# Patient Record
Sex: FEMALE | Race: OTHER | URBAN - METROPOLITAN AREA
[De-identification: names, ages, dates, MRNs, and addresses within clinical notes are randomized per-mention and may not be internally consistent; named-entity substitution may affect disease eponyms.]

---

## 2022-05-23 ENCOUNTER — HOSPITAL ENCOUNTER (OUTPATIENT)
Dept: LAB | Age: 37
Discharge: HOME | End: 2022-05-23

## 2022-05-23 ENCOUNTER — OFFICE VISIT (OUTPATIENT)
Dept: FAMILY MEDICINE CLINIC | Age: 37
End: 2022-05-23

## 2022-05-23 VITALS
OXYGEN SATURATION: 100 % | HEART RATE: 79 BPM | SYSTOLIC BLOOD PRESSURE: 159 MMHG | HEIGHT: 63 IN | TEMPERATURE: 97.8 F | WEIGHT: 175 LBS | BODY MASS INDEX: 31.01 KG/M2 | DIASTOLIC BLOOD PRESSURE: 90 MMHG

## 2022-05-23 DIAGNOSIS — G47.00 INSOMNIA, UNSPECIFIED TYPE: ICD-10-CM

## 2022-05-23 DIAGNOSIS — H11.433 CONJUNCTIVAL HYPEREMIA OF BOTH EYES: ICD-10-CM

## 2022-05-23 DIAGNOSIS — R03.0 ELEVATED BP WITHOUT DIAGNOSIS OF HYPERTENSION: ICD-10-CM

## 2022-05-23 DIAGNOSIS — G89.29 CHRONIC RUQ PAIN: ICD-10-CM

## 2022-05-23 DIAGNOSIS — R10.2 PELVIC PAIN: ICD-10-CM

## 2022-05-23 DIAGNOSIS — R10.11 CHRONIC RUQ PAIN: Primary | ICD-10-CM

## 2022-05-23 DIAGNOSIS — R10.11 CHRONIC RUQ PAIN: ICD-10-CM

## 2022-05-23 DIAGNOSIS — G89.29 CHRONIC RUQ PAIN: Primary | ICD-10-CM

## 2022-05-23 PROCEDURE — 81003 URINALYSIS AUTO W/O SCOPE: CPT

## 2022-05-23 PROCEDURE — 99203 OFFICE O/P NEW LOW 30 MIN: CPT | Performed by: FAMILY MEDICINE

## 2022-05-23 PROCEDURE — 85025 COMPLETE CBC W/AUTO DIFF WBC: CPT

## 2022-05-23 PROCEDURE — 83690 ASSAY OF LIPASE: CPT

## 2022-05-23 PROCEDURE — 83036 HEMOGLOBIN GLYCOSYLATED A1C: CPT

## 2022-05-23 PROCEDURE — 80053 COMPREHEN METABOLIC PANEL: CPT

## 2022-05-23 PROCEDURE — 80061 LIPID PANEL: CPT

## 2022-05-23 PROCEDURE — 84443 ASSAY THYROID STIM HORMONE: CPT

## 2022-05-23 PROCEDURE — 86038 ANTINUCLEAR ANTIBODIES: CPT

## 2022-05-23 RX ORDER — TRAZODONE HYDROCHLORIDE 50 MG/1
50 TABLET ORAL
Qty: 14 TABLET | Refills: 0 | Status: SHIPPED | OUTPATIENT
Start: 2022-05-23 | End: 2022-06-06

## 2022-05-23 RX ORDER — OLOPATADINE HYDROCHLORIDE 1 MG/ML
2 SOLUTION/ DROPS OPHTHALMIC 2 TIMES DAILY
Qty: 5 ML | Refills: 0 | Status: SHIPPED | OUTPATIENT
Start: 2022-05-23

## 2022-05-23 NOTE — PROGRESS NOTES
I have printed AVS and reviewed it with patient today. Patient verbalized understanding. I reviewed with patient medications sent to pharmacy and how the medication is taken. Patient verbalized understanding. The patient was texted [employer] coupons for prescriptions and I explained how the coupons are used. Patient verbalized understanding. I instructed patient to schedule a follow-up appointment prior to leaving today. Patient verbalized understanding. Patient correctly stated her full name and date of birth prior to the information shared.  Alyssa  with the [place] [id number] assisted with this discharge.  Susana Livingston RN

## 2022-05-23 NOTE — PROGRESS NOTES
Adam Mike is a 39 y.o. female   Chief Complaint   Patient presents with    Other     Please check derpession screening    Flank Pain     Pt c/o right flank pain radiationg to her right back x 2 weeks with pelvic pain x 1 years    Blood Pressure Check     Pt states she has BP problems x 1 year         ASSESSMENT AND PLAN:    1. Chronic RUQ pain  Exam unremarkable  Reports normal ultrasound and endoscopy in her home country. Will start with labs today and consider imaging pending results.    - HEMOGLOBIN A1C WITH EAG; Future  - LIPID PANEL; Future  - METABOLIC PANEL, COMPREHENSIVE; Future  - URINALYSIS W/ RFLX MICROSCOPIC; Future  - LIPASE; Future    2. Pelvic pain  No TTP. S/P oopherectomy and C/S. No herniations palpated. Will revisit at followup. 3. Insomnia, unspecified type  Start trazodone 50mg QHS for sleep. - CBC WITH AUTOMATED DIFF; Future  - TSH 3RD GENERATION; Future  - CHADD, DIRECT, W/REFLEX; Future  - traZODone (DESYREL) 50 mg tablet; Take 1 Tablet by mouth nightly for 14 days. Indications: insomnia associated with depression  Dispense: 14 Tablet; Refill: 0      4. Elevated BP without diagnosis of hypertension  Pt reports low Bps last week at home. Continue to monitor. If elevated on followup, start med. 5. Conjunctival hyperemia of both eyes  Allergic vs. Due to lack of sleep. Eye drops. Trazodone for sleep.  - olopatadine (PATANOL) 0.1 % ophthalmic solution; Administer 2 Drops to both eyes two (2) times a day. Indications: allergic conjunctivitis  Dispense: 5 mL; Refill: 0    SUBJECTIVE:    HPI:  Adam Mike is a 39 y.o. female who presents with multiple symptoms. She has been suffering from RUQ pain that radiates to her back. It is worse at night. She has to keep switching positions, but noting is comfortable. It is not associated with meals.  It is not typically associated with nausea, though a few weeks ago she did have 3 days of nausea and loss of appetite when the pain was very strong. She admits to occasional diarrhea. In her country she has had a RUQUS and endoscopy but everything was normal and she wasn't given any treatment. She also feels fleeting stabbing pains in her pelvis. She feels like there is a ball inside her right pelvis, though when she palpates she doesn't feel anything. Sometimes she feels it on the left too. People have told her it might be her ovaries, but she doesn't have a left ovary. 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 She states the baby was 10 lbs and the tumor was 6 lbs. They removed it at the same time as her C/S. Lately she just wants to stay in bed. But she can't sleep. She's getting about 3-4 hrs of sleep a night. Her mind starts racing in addition ot the pain. Her eyes have been red and swollen. She has had blurry vision for about 6 months -- she had an eye exam but was told everything was normal.  Her tongue has been swollen and tender. She has felt anxiety and depressive symptoms for a while but has never been treated. She has a BP machine at home. She reports this week her BP has been high but last week it was as low as 83/55. No h/o HTN    PMH: denies  PSH: C/S, left oopherectomy  MEDS: none  ALL: NKDA  SH: Denies   FH: M - DM2      Review of Systems   Constitutional: Positive for malaise/fatigue. Negative for fever. Eyes: Positive for blurred vision, discharge and redness. Respiratory: Negative for cough and shortness of breath. Cardiovascular: Negative for chest pain, palpitations and leg swelling. Gastrointestinal: Positive for abdominal pain and constipation. Negative for blood in stool, diarrhea, nausea and vomiting. Genitourinary: Positive for flank pain. Musculoskeletal: Positive for back pain and myalgias. Neurological: Negative for dizziness and headaches. Psychiatric/Behavioral: Positive for depression. Negative for suicidal ideas.  The patient is nervous/anxious and has insomnia. BP (!) 159/90 (BP 1 Location: Right arm, BP Patient Position: Sitting)   Pulse 79   Temp 97.8 °F (36.6 °C) (Temporal)   Ht 5' 2.6\" (1.59 m)   Wt 175 lb (79.4 kg)   SpO2 100%   BMI 31.40 kg/m²     Physical Exam  Constitutional:       Appearance: Normal appearance. Comments: Appears uncomfortable   HENT:      Mouth/Throat:      Mouth: Mucous membranes are moist.      Pharynx: Oropharynx is clear. No posterior oropharyngeal erythema. Eyes:      General:         Right eye: Discharge (watery) present. Left eye: Discharge present. Extraocular Movements: Extraocular movements intact. Conjunctiva/sclera:      Right eye: Right conjunctiva is injected. Left eye: Left conjunctiva is injected. Pupils: Pupils are equal, round, and reactive to light. Cardiovascular:      Rate and Rhythm: Normal rate and regular rhythm. Heart sounds: Normal heart sounds. Pulmonary:      Effort: Pulmonary effort is normal.      Breath sounds: Normal breath sounds. Abdominal:      General: Bowel sounds are normal. There is no distension. Palpations: Abdomen is soft. Tenderness: There is abdominal tenderness (\"a little\" tenderness in Left pelvis). There is no right CVA tenderness, left CVA tenderness, guarding or rebound. Hernia: No hernia is present. Musculoskeletal:      Right lower leg: No edema. Left lower leg: No edema. Lymphadenopathy:      Cervical: No cervical adenopathy. Neurological:      Mental Status: She is alert and oriented to person, place, and time.

## 2022-05-24 LAB
ALBUMIN SERPL-MCNC: 4.1 G/DL (ref 3.5–5)
ALBUMIN/GLOB SERPL: 1.2 {RATIO} (ref 1.1–2.2)
ALP SERPL-CCNC: 95 U/L (ref 45–117)
ALT SERPL-CCNC: 28 U/L (ref 12–78)
ANION GAP SERPL CALC-SCNC: 5 MMOL/L (ref 5–15)
APPEARANCE UR: CLEAR
AST SERPL-CCNC: 15 U/L (ref 15–37)
BASOPHILS # BLD: 0.1 K/UL (ref 0–0.1)
BASOPHILS NFR BLD: 1 % (ref 0–1)
BILIRUB SERPL-MCNC: 0.4 MG/DL (ref 0.2–1)
BILIRUB UR QL: NEGATIVE
BUN SERPL-MCNC: 8 MG/DL (ref 6–20)
BUN/CREAT SERPL: 13 (ref 12–20)
CALCIUM SERPL-MCNC: 9.6 MG/DL (ref 8.5–10.1)
CHLORIDE SERPL-SCNC: 108 MMOL/L (ref 97–108)
CHOLEST SERPL-MCNC: 201 MG/DL
CO2 SERPL-SCNC: 27 MMOL/L (ref 21–32)
COLOR UR: NORMAL
CREAT SERPL-MCNC: 0.6 MG/DL (ref 0.55–1.02)
DIFFERENTIAL METHOD BLD: ABNORMAL
EOSINOPHIL # BLD: 0.1 K/UL (ref 0–0.4)
EOSINOPHIL NFR BLD: 2 % (ref 0–7)
ERYTHROCYTE [DISTWIDTH] IN BLOOD BY AUTOMATED COUNT: 13.2 % (ref 11.5–14.5)
EST. AVERAGE GLUCOSE BLD GHB EST-MCNC: 108 MG/DL
GLOBULIN SER CALC-MCNC: 3.4 G/DL (ref 2–4)
GLUCOSE SERPL-MCNC: 91 MG/DL (ref 65–100)
GLUCOSE UR STRIP.AUTO-MCNC: NEGATIVE MG/DL
HBA1C MFR BLD: 5.4 % (ref 4–5.6)
HCT VFR BLD AUTO: 41.2 % (ref 35–47)
HDLC SERPL-MCNC: 56 MG/DL
HDLC SERPL: 3.6 {RATIO} (ref 0–5)
HGB BLD-MCNC: 13.6 G/DL (ref 11.5–16)
HGB UR QL STRIP: NEGATIVE
IMM GRANULOCYTES # BLD AUTO: 0 K/UL (ref 0–0.04)
IMM GRANULOCYTES NFR BLD AUTO: 1 % (ref 0–0.5)
KETONES UR QL STRIP.AUTO: NEGATIVE MG/DL
LDLC SERPL CALC-MCNC: 90.2 MG/DL (ref 0–100)
LEUKOCYTE ESTERASE UR QL STRIP.AUTO: NEGATIVE
LIPASE SERPL-CCNC: 133 U/L (ref 73–393)
LYMPHOCYTES # BLD: 1.4 K/UL (ref 0.8–3.5)
LYMPHOCYTES NFR BLD: 22 % (ref 12–49)
MCH RBC QN AUTO: 29.1 PG (ref 26–34)
MCHC RBC AUTO-ENTMCNC: 33 G/DL (ref 30–36.5)
MCV RBC AUTO: 88.2 FL (ref 80–99)
MONOCYTES # BLD: 0.4 K/UL (ref 0–1)
MONOCYTES NFR BLD: 6 % (ref 5–13)
NEUTS SEG # BLD: 4.3 K/UL (ref 1.8–8)
NEUTS SEG NFR BLD: 68 % (ref 32–75)
NITRITE UR QL STRIP.AUTO: NEGATIVE
NRBC # BLD: 0 K/UL (ref 0–0.01)
NRBC BLD-RTO: 0 PER 100 WBC
PH UR STRIP: 7 [PH] (ref 5–8)
PLATELET # BLD AUTO: 243 K/UL (ref 150–400)
PMV BLD AUTO: 12.6 FL (ref 8.9–12.9)
POTASSIUM SERPL-SCNC: 3.8 MMOL/L (ref 3.5–5.1)
PROT SERPL-MCNC: 7.5 G/DL (ref 6.4–8.2)
PROT UR STRIP-MCNC: NEGATIVE MG/DL
RBC # BLD AUTO: 4.67 M/UL (ref 3.8–5.2)
SODIUM SERPL-SCNC: 140 MMOL/L (ref 136–145)
SP GR UR REFRACTOMETRY: 1.01 (ref 1–1.03)
TRIGL SERPL-MCNC: 274 MG/DL (ref ?–150)
TSH SERPL DL<=0.05 MIU/L-ACNC: 3.79 UIU/ML (ref 0.36–3.74)
UR CULT HOLD, URHOLD: NORMAL
UROBILINOGEN UR QL STRIP.AUTO: 0.2 EU/DL (ref 0.2–1)
VLDLC SERPL CALC-MCNC: 54.8 MG/DL
WBC # BLD AUTO: 6.3 K/UL (ref 3.6–11)

## 2022-05-25 LAB — ANA SER QL: NEGATIVE

## 2022-05-29 NOTE — PROGRESS NOTES
CHADD   UA WNL  CMP, CBC, Lipase, A1C WNL  Tgs elevated, otherwise normal lipid panel  Slight TSH elevation. Will discuss with the patient at followup.

## 2022-06-06 ENCOUNTER — HOSPITAL ENCOUNTER (OUTPATIENT)
Dept: LAB | Age: 37
Discharge: HOME | End: 2022-06-06

## 2022-06-06 ENCOUNTER — OFFICE VISIT (OUTPATIENT)
Dept: FAMILY MEDICINE CLINIC | Age: 37
End: 2022-06-06

## 2022-06-06 VITALS
SYSTOLIC BLOOD PRESSURE: 138 MMHG | DIASTOLIC BLOOD PRESSURE: 70 MMHG | HEIGHT: 63 IN | TEMPERATURE: 97.7 F | HEART RATE: 73 BPM | WEIGHT: 178 LBS | OXYGEN SATURATION: 100 % | BODY MASS INDEX: 31.54 KG/M2

## 2022-06-06 DIAGNOSIS — R30.0 DYSURIA: Primary | ICD-10-CM

## 2022-06-06 DIAGNOSIS — R14.2 BURPING: ICD-10-CM

## 2022-06-06 DIAGNOSIS — K59.00 CONSTIPATION, UNSPECIFIED CONSTIPATION TYPE: ICD-10-CM

## 2022-06-06 DIAGNOSIS — R30.0 DYSURIA: ICD-10-CM

## 2022-06-06 DIAGNOSIS — R35.0 URINARY FREQUENCY: ICD-10-CM

## 2022-06-06 LAB
BILIRUB UR QL STRIP: NEGATIVE
GLUCOSE UR-MCNC: NEGATIVE MG/DL
KETONES P FAST UR STRIP-MCNC: NEGATIVE MG/DL
PH UR STRIP: 7 [PH] (ref 4.6–8)
PROT UR QL STRIP: NEGATIVE
SP GR UR STRIP: 1.02 (ref 1–1.03)
UA UROBILINOGEN AMB POC: NORMAL (ref 0.2–1)
URINALYSIS CLARITY POC: CLEAR
URINALYSIS COLOR POC: YELLOW
URINE BLOOD POC: NORMAL
URINE LEUKOCYTES POC: NORMAL
URINE NITRITES POC: NEGATIVE

## 2022-06-06 PROCEDURE — 87086 URINE CULTURE/COLONY COUNT: CPT

## 2022-06-06 PROCEDURE — 81002 URINALYSIS NONAUTO W/O SCOPE: CPT | Performed by: FAMILY MEDICINE

## 2022-06-06 PROCEDURE — 99213 OFFICE O/P EST LOW 20 MIN: CPT | Performed by: FAMILY MEDICINE

## 2022-06-06 RX ORDER — SIMETHICONE 125 MG
125 TABLET,CHEWABLE ORAL
Qty: 60 TABLET | Refills: 1 | Status: SHIPPED | OUTPATIENT
Start: 2022-06-06 | End: 2022-09-07

## 2022-06-06 RX ORDER — DOCUSATE SODIUM 100 MG/1
100 CAPSULE, LIQUID FILLED ORAL 2 TIMES DAILY
Qty: 60 CAPSULE | Refills: 2 | Status: SHIPPED | OUTPATIENT
Start: 2022-06-06 | End: 2022-09-04

## 2022-06-06 RX ORDER — NITROFURANTOIN 25; 75 MG/1; MG/1
100 CAPSULE ORAL 2 TIMES DAILY
Qty: 14 CAPSULE | Refills: 0 | Status: SHIPPED | OUTPATIENT
Start: 2022-06-06 | End: 2022-06-13

## 2022-06-06 RX ORDER — PHENAZOPYRIDINE HYDROCHLORIDE 200 MG/1
200 TABLET, FILM COATED ORAL
Qty: 9 TABLET | Refills: 0 | Status: SHIPPED | OUTPATIENT
Start: 2022-06-06 | End: 2022-06-09

## 2022-06-06 NOTE — PROGRESS NOTES
An After Visit Summary was printed and reviewed with the patient. Informed patient to give name and date of birth when picking up medication. Patient verbalized understanding.   Keith So

## 2022-06-06 NOTE — PROGRESS NOTES
Dia Dodge is a 39 y.o. female   Chief Complaint   Patient presents with    Follow-up     multiple symptoms         ASSESSMENT AND PLAN:    1. Dysuria  2. Urinary frequency  - AMB POC URINALYSIS DIP STICK MANUAL W/O MICRO  - CULTURE, URINE; Future  - nitrofurantoin, macrocrystal-monohydrate, (Macrobid) 100 mg capsule; Take 1 Capsule by mouth two (2) times a day for 7 days. Indications: bacterial urinary tract infection  Dispense: 14 Capsule; Refill: 0  - phenazopyridine (PYRIDIUM) 200 mg tablet; Take 1 Tablet by mouth three (3) times daily as needed for Pain for up to 3 days. Dispense: 9 Tablet; Refill: 0  - CULTURE, URINE; Future    3. Burping  - simethicone (GAS-X) 125 mg chewable tablet; Take 1 Tablet by mouth every six (6) hours as needed for Flatulence. Dispense: 60 Tablet; Refill: 1    4. Constipation, unspecified constipation type  - docusate sodium (COLACE) 100 mg capsule; Take 1 Capsule by mouth two (2) times a day for 90 days. Dispense: 60 Capsule; Refill: 2      SUBJECTIVE:    HPI:  Dia Dodge is a 39 y.o. female who presents for followup. She was last seen 5/23 with RUQ pain, pelvic pain, insomnia, eye irritation and variable blood pressures. She reports she only had RUQ once since the last visit and she has been sleeping better. We reviewed her overall normal labwork. She is still having variable blood pressures. She states she has had urinary issues on and off for 27 years. She currently has urinary frequency and burning with urination. No urgency, no hematuria, no fevers. She has flank pain at night which is better when she lies down. Her ankles start to swell after 2 pm.  No vaginal discharge,    She feels like she is burping a lot. She is also flatulent all the time. It is not related to food. No N/V. No abdominal pain. She admits to constipation. Review of Systems   Constitutional: Negative for fever and malaise/fatigue. Eyes: Negative for blurred vision.    Respiratory: Negative for cough and shortness of breath. Cardiovascular: Positive for leg swelling. Negative for chest pain and palpitations. Gastrointestinal: Positive for constipation. Negative for abdominal pain, diarrhea, nausea and vomiting. Genitourinary: Positive for dysuria, flank pain and frequency. Negative for hematuria and urgency. Neurological: Negative for dizziness and headaches. /70 (BP 1 Location: Right arm, BP Patient Position: Sitting)   Pulse 73   Temp 97.7 °F (36.5 °C) (Temporal)   Ht 5' 2.6\" (1.59 m)   Wt 178 lb (80.7 kg)   LMP  (LMP Unknown)   SpO2 100%   BMI 31.94 kg/m²     Physical Exam  Constitutional:       General: She is not in acute distress. Appearance: Normal appearance. Eyes:      Extraocular Movements: Extraocular movements intact. Conjunctiva/sclera: Conjunctivae normal.      Pupils: Pupils are equal, round, and reactive to light. Cardiovascular:      Rate and Rhythm: Normal rate and regular rhythm. Heart sounds: Normal heart sounds. Pulmonary:      Effort: Pulmonary effort is normal.      Breath sounds: Normal breath sounds. Abdominal:      General: Bowel sounds are normal. There is no distension. Tenderness: There is no abdominal tenderness. There is no right CVA tenderness, left CVA tenderness or guarding. Neurological:      Mental Status: She is alert.

## 2022-06-06 NOTE — PROGRESS NOTES
Results for orders placed or performed in visit on 06/06/22   AMB POC URINALYSIS DIP STICK MANUAL W/O MICRO   Result Value Ref Range    Color (UA POC) Yellow     Clarity (UA POC) Clear     Glucose (UA POC) Negative Negative    Bilirubin (UA POC) Negative Negative    Ketones (UA POC) Negative Negative    Specific gravity (UA POC) 1.020 1.001 - 1.035    Blood (UA POC) Trace Negative    pH (UA POC) 7.0 4.6 - 8.0    Protein (UA POC) Negative Negative    Urobilinogen (UA POC) 0.2 mg/dL 0.2 - 1    Nitrites (UA POC) Negative Negative    Leukocyte esterase (UA POC) Trace Negative

## 2022-06-06 NOTE — PROGRESS NOTES
Coordination of Care  1. Have you been to the ER, urgent care clinic since your last visit? Hospitalized since your last visit? No    2. Have you seen or consulted any other health care providers outside of the [address] [place] [address] since your last visit? Include any pap smears or colon screening. No    Does the patient need refills? NO    Learning Assessment Complete?  yes  Depression Screening complete in the past 12 months? yes

## 2022-06-09 LAB
BACTERIA SPEC CULT: NORMAL
SERVICE CMNT-IMP: NORMAL

## 2022-07-05 ENCOUNTER — OFFICE VISIT (OUTPATIENT)
Dept: FAMILY MEDICINE CLINIC | Age: 37
End: 2022-07-05

## 2022-07-05 VITALS
OXYGEN SATURATION: 99 % | HEIGHT: 63 IN | WEIGHT: 176 LBS | TEMPERATURE: 97.7 F | BODY MASS INDEX: 31.18 KG/M2 | HEART RATE: 80 BPM | DIASTOLIC BLOOD PRESSURE: 79 MMHG | SYSTOLIC BLOOD PRESSURE: 141 MMHG

## 2022-07-05 DIAGNOSIS — F33.1 MODERATE EPISODE OF RECURRENT MAJOR DEPRESSIVE DISORDER (HCC): Primary | ICD-10-CM

## 2022-07-05 PROCEDURE — 99214 OFFICE O/P EST MOD 30 MIN: CPT | Performed by: FAMILY MEDICINE

## 2022-07-05 RX ORDER — SERTRALINE HYDROCHLORIDE 25 MG/1
25 TABLET, FILM COATED ORAL DAILY
Qty: 30 TABLET | Refills: 2 | Status: SHIPPED | OUTPATIENT
Start: 2022-07-05 | End: 2022-08-08 | Stop reason: SDUPTHER

## 2022-07-05 NOTE — PROGRESS NOTES
Coordination of Care  1. Have you been to the ER, urgent care clinic since your last visit? Hospitalized since your last visit? No    2. Have you seen or consulted any other health care providers outside of the [address] [place] [address] since your last visit? Include any pap smears or colon screening. No    Does the patient need refills? YES    Learning Assessment Complete?  yes  Depression Screening complete in the past 12 months? yes      Visual Acuity Screening    Right eye Left eye Both eyes   Without correction: 20/80 20/63 20/63   With correction:

## 2022-07-05 NOTE — PROGRESS NOTES
An After Visit Summary was printed and given to the patient. Discharge medications reviewed with patient and appropriate educational materials and side effects teaching were provided. Goodrx coupon provided and RN explained use. RN provided the national lifeline phone number in case of crisis and explained that our counselor's team will reach out for appointment scheduling. Time for questions and answers provided, patient verbalized understanding. Patient discharged from clinic in stable condition. REN  Jaylin Sagastume assisted with this d/c. 0.25

## 2022-07-05 NOTE — PROGRESS NOTES
Kwan Santiago is a 40 y.o. female   Chief Complaint   Patient presents with    Other     Depression f/up    Abdominal Pain     dysuria, f/up    Eye Problem     bilateral blurred vision    Medication Refill     Pt is not sure which meds she needs         ASSESSMENT AND PLAN:    1. Moderate episode of recurrent major depressive disorder (HCC)  PHQ9 23 today. Start zoloft - r/b/i reviewed. Refer to [address] [place] [address]  Follow up in one month, sooner if needed. ED for worsening SI.  - REFERRAL TO BEHAVIORAL HEALTH  - sertraline (ZOLOFT) 25 mg tablet; Take 1 Tablet by mouth daily. Indications: major depressive disorder  Dispense: 30 Tablet; Refill: 2    SUBJECTIVE:    HPI:  Kwan Santiago is a 40 y.o. female who presents for followup. She reports that her abdominal pain has improved, she had 2 mild episodes in the past month. Today her main concern is that she is feeling very down. She has no interest in doing anything or leaving her house. She doesn't even want to cook. She is sleeping poorly and has no energy. Her body aches. She has lost her appetite. She feels bad about herself. She cannot concentrate and is forgetting things easily. She gets upset/emotional easily. She does not want to kill herself but has thought she'd be better off dead. No planning. She has 2 kids in [place] who are also struggling and she feels terrible that she is not there to help them. She doesn't have a support system here. She never goes out. She has felt depressed in the past but hasn't received treatment. She is interested in both therapy and medication management. Review of Systems   Constitutional: Positive for malaise/fatigue. Negative for fever. Eyes: Negative for blurred vision. Respiratory: Negative for cough and shortness of breath. Cardiovascular: Negative for chest pain, palpitations and leg swelling. Gastrointestinal: Negative for abdominal pain, constipation, diarrhea, nausea and vomiting. Musculoskeletal: Positive for myalgias. Neurological: Negative for dizziness and headaches. Psychiatric/Behavioral: Positive for depression and suicidal ideas. Negative for substance abuse. The patient has insomnia. The patient is not nervous/anxious. BP (!) 141/79 (BP 1 Location: Right arm, BP Patient Position: Sitting)   Pulse 80   Temp 97.7 °F (36.5 °C) (Temporal)   Ht 5' 2.6\" (1.59 m)   Wt 176 lb (79.8 kg)   LMP  (LMP Unknown)   SpO2 99%   BMI 31.58 kg/m²     Physical Exam  Constitutional:       General: She is not in acute distress. Appearance: Normal appearance. Eyes:      Extraocular Movements: Extraocular movements intact. Conjunctiva/sclera: Conjunctivae normal.      Pupils: Pupils are equal, round, and reactive to light. Cardiovascular:      Rate and Rhythm: Normal rate and regular rhythm. Heart sounds: Normal heart sounds. Pulmonary:      Effort: Pulmonary effort is normal.      Breath sounds: Normal breath sounds. Neurological:      Mental Status: She is alert. Psychiatric:         Attention and Perception: Attention normal.         Mood and Affect: Mood is depressed. Affect is flat and tearful.          Speech: Speech normal.         Behavior: Behavior normal.

## 2022-08-08 ENCOUNTER — OFFICE VISIT (OUTPATIENT)
Dept: FAMILY MEDICINE CLINIC | Age: 37
End: 2022-08-08

## 2022-08-08 VITALS
SYSTOLIC BLOOD PRESSURE: 119 MMHG | TEMPERATURE: 96.9 F | OXYGEN SATURATION: 100 % | DIASTOLIC BLOOD PRESSURE: 70 MMHG | WEIGHT: 174.8 LBS | HEART RATE: 67 BPM | BODY MASS INDEX: 30.97 KG/M2 | HEIGHT: 63 IN | RESPIRATION RATE: 18 BRPM

## 2022-08-08 DIAGNOSIS — R30.0 DYSURIA: ICD-10-CM

## 2022-08-08 DIAGNOSIS — F33.1 MODERATE EPISODE OF RECURRENT MAJOR DEPRESSIVE DISORDER (HCC): Primary | ICD-10-CM

## 2022-08-08 LAB
BILIRUB UR QL STRIP: NEGATIVE
GLUCOSE UR-MCNC: NEGATIVE MG/DL
KETONES P FAST UR STRIP-MCNC: NEGATIVE MG/DL
PH UR STRIP: 7 [PH] (ref 4.6–8)
PROT UR QL STRIP: NEGATIVE
SP GR UR STRIP: 1.02 (ref 1–1.03)
UA UROBILINOGEN AMB POC: NORMAL (ref 0.2–1)
URINALYSIS CLARITY POC: CLEAR
URINALYSIS COLOR POC: YELLOW
URINE BLOOD POC: NEGATIVE
URINE LEUKOCYTES POC: NEGATIVE
URINE NITRITES POC: NEGATIVE

## 2022-08-08 PROCEDURE — 81002 URINALYSIS NONAUTO W/O SCOPE: CPT | Performed by: FAMILY MEDICINE

## 2022-08-08 PROCEDURE — 99214 OFFICE O/P EST MOD 30 MIN: CPT | Performed by: FAMILY MEDICINE

## 2022-08-08 RX ORDER — SERTRALINE HYDROCHLORIDE 25 MG/1
50 TABLET, FILM COATED ORAL DAILY
Qty: 60 TABLET | Refills: 1 | Status: SHIPPED | OUTPATIENT
Start: 2022-08-08 | End: 2022-09-07 | Stop reason: ALTCHOICE

## 2022-08-08 NOTE — PROGRESS NOTES
Chief Complaint   Patient presents with    Depression     F/up - pt states feels much better than last time (see PHQ-9); note pt has been doubling up on sertraline on \"days when I fee like I need more\"    Dysuria     Burning on urination for past several days; denies dysuria, urinary frequency   Visit Vitals  /70 (BP 1 Location: Left upper arm, BP Patient Position: Sitting)   Pulse 67   Temp 96.9 °F (36.1 °C) (Temporal)   Resp 18   Ht 5' 2.6\" (1.59 m)   Wt 174 lb 12.8 oz (79.3 kg)   SpO2 100%   BMI 31.36 kg/m²     Results for orders placed or performed in visit on 08/08/22   AMB POC URINALYSIS DIP STICK MANUAL W/O MICRO   Result Value Ref Range    Color (UA POC) Yellow     Clarity (UA POC) Clear     Glucose (UA POC) Negative Negative    Bilirubin (UA POC) Negative Negative    Ketones (UA POC) Negative Negative    Specific gravity (UA POC) 1.020 1.001 - 1.035    Blood (UA POC) Negative Negative    pH (UA POC) 7.0 4.6 - 8.0    Protein (UA POC) Negative Negative    Urobilinogen (UA POC) 0.2 mg/dL 0.2 - 1    Nitrites (UA POC) Negative Negative    Leukocyte esterase (UA POC) Negative Negative       Coordination of Care  1. Have you been to the ER, urgent care clinic since your last visit? Hospitalized since your last visit? No    2. Have you seen or consulted any other health care providers outside of the [address] [place] [address] since your last visit? Include any pap smears or colon screening. No    Does the patient need refills? NO    Learning Assessment Complete?  yes  Depression Screening complete in the past 12 months? yes

## 2022-08-08 NOTE — PROGRESS NOTES
Name and  confirmed w/ patient. An After Visit Summary was provided and all discharge instructions were reviewed with the patient including: f/up appt and refills. RN emphasized importance of only taking medications as prescribed. Time for questions and answers provided, patient verbalized understanding. Patient discharged from clinic in stable condition. CVAN  Kerry De La Vega assisted with this d/c.

## 2022-08-08 NOTE — PROGRESS NOTES
Arron Woods is a 40 y.o. female   Chief Complaint   Patient presents with    Depression     F/up - pt states feels much better than last time (see PHQ-9); note pt has been doubling up on sertraline on \"days when I fee like I need more\"    Dysuria     Burning on urination for past several days; denies dysuria, urinary frequency         ASSESSMENT AND PLAN:    1. Moderate episode of recurrent major depressive disorder (HCC)  PHQ-9 down to 9 from 23. Increase zoloft to 50mg daily. Follow up with Miki. Follow up with me VV in 1 month. - sertraline (ZOLOFT) 25 mg tablet; Take 2 Tablets by mouth in the morning. Indications: major depressive disorder  Dispense: 60 Tablet; Refill: 1    2. Dysuria  Urine dip negative for infection and blood. - AMB POC URINALYSIS DIP STICK MANUAL W/O MICRO      SUBJECTIVE:    HPI:  Arron Woods is a 40 y.o. female who presents for follow-up on depression. At her last visit 7/5 we started Zoloft 25mg. She reports she has been feeling much better with her mood, but also her somatic symptoms have improved. She notes that at first she didn't see much of a difference and that some days when she's feeling down she'll take 1.5 or 2 tabs of the zoloft and feels it helps. She denies medication side effects. She sometimes gets sad when she hears bad news but rebounds more quickly. She is sleeping well. Her appetite is decreased but she suspects it's from the heat. Her energy is better. She still has occasional thoughts she'd be better off dead but not as frequently or as severe. She has had some intermittent burning with urination. No frequency, urgency, hematuria, flank pain, fevers, vaginal discharge, vulvar irritation or itching. Review of Systems   Constitutional:  Negative for fever and malaise/fatigue. Eyes:  Negative for blurred vision. Respiratory:  Negative for cough and shortness of breath.     Cardiovascular:  Negative for chest pain, palpitations and leg swelling. Gastrointestinal:  Negative for abdominal pain, constipation, diarrhea, nausea and vomiting. Genitourinary:  Positive for dysuria. Negative for flank pain, frequency, hematuria and urgency. Neurological:  Negative for dizziness and headaches. Psychiatric/Behavioral:  Positive for depression and suicidal ideas (passive, improving). The patient does not have insomnia. /70 (BP 1 Location: Left upper arm, BP Patient Position: Sitting)   Pulse 67   Temp 96.9 °F (36.1 °C) (Temporal)   Resp 18   Ht 5' 2.6\" (1.59 m)   Wt 174 lb 12.8 oz (79.3 kg)   SpO2 100%   BMI 31.36 kg/m²     Physical Exam  Constitutional:       General: She is not in acute distress. Appearance: Normal appearance. Eyes:      Extraocular Movements: Extraocular movements intact. Conjunctiva/sclera: Conjunctivae normal.      Pupils: Pupils are equal, round, and reactive to light. Cardiovascular:      Rate and Rhythm: Normal rate and regular rhythm. Heart sounds: Normal heart sounds. Pulmonary:      Effort: Pulmonary effort is normal.      Breath sounds: Normal breath sounds. Neurological:      Mental Status: She is alert.

## 2022-09-06 ENCOUNTER — OFFICE VISIT (OUTPATIENT)
Dept: FAMILY MEDICINE CLINIC | Age: 37
End: 2022-09-06

## 2022-09-06 DIAGNOSIS — F33.0 MDD (MAJOR DEPRESSIVE DISORDER), RECURRENT EPISODE, MILD (HCC): Primary | ICD-10-CM

## 2022-09-06 PROCEDURE — 90791 PSYCH DIAGNOSTIC EVALUATION: CPT | Performed by: SOCIAL WORKER

## 2022-09-07 ENCOUNTER — VIRTUAL VISIT (OUTPATIENT)
Dept: FAMILY MEDICINE CLINIC | Age: 37
End: 2022-09-07

## 2022-09-07 DIAGNOSIS — F33.1 MODERATE EPISODE OF RECURRENT MAJOR DEPRESSIVE DISORDER (HCC): Primary | ICD-10-CM

## 2022-09-07 PROCEDURE — 99213 OFFICE O/P EST LOW 20 MIN: CPT | Performed by: FAMILY MEDICINE

## 2022-09-07 RX ORDER — BUPROPION HYDROCHLORIDE 150 MG/1
150 TABLET ORAL DAILY
Qty: 30 TABLET | Refills: 2 | Status: SHIPPED | OUTPATIENT
Start: 2022-09-07 | End: 2022-10-12

## 2022-09-07 NOTE — PROGRESS NOTES
Tc to the pt for intake. AMN Int #  [id number]. the pt verified her name and . The pt stated she is taking Neurobion for the brain, from the [address] she is taking a Be Active Calcium supplement, she bought from the [facility] [employer]. Coordination of Care  1. Have you been to the ER, urgent care clinic since your last visit? Hospitalized since your last visit? No    2. Have you seen or consulted any other health care providers outside of the [address] [place] [address] since your last visit? Include any pap smears or colon screening. No    Does the patient need refills?  NO    Learning Assessment Complete? no  Depression Screening complete in the past 12 months? yes

## 2022-09-07 NOTE — PROGRESS NOTES
Katrina Callahan is a 40 y.o. female   Chief Complaint   Patient presents with    Depression     Follow up.      >>>>>>>>>>>>>TELEPHONE ENCOUNTER<<<<<<<<<<<<<<<<<<<<      ASSESSMENT AND PLAN:    1. Moderate episode of recurrent major depressive disorder ([facility] [address]  PHQ9: 9 today, stable from last visit. Pt interested in medication adjustment due to persisting lack of motivation. Discussed increasing dose of sertraline, adding another medication to sertraline, or switching medication. Pt preferred to switch medication. Start bupropion XL 150mg daily. Follow up in one month. Continue appointments with Shannen Denise as previously planned. - buPROPion XL (WELLBUTRIN XL) 150 mg tablet; Take 1 Tablet by mouth daily. Indications: major depressive disorder  Dispense: 30 Tablet; Refill: 2    SUBJECTIVE:    HPI:  Katrina Callahan is a 40 y.o. female who presents for follow up on MDD. She has been taking 50mg sertraline daily. She had an initial impressive improvement from a PHQ9 of 23 to 9. She has not seen additional progress since then despite the increase and reports she has good days and bad days. The bothersome symptom to her is her lack of motivation. She doesn't want to leave her house and has difficulty getting things done. She had an appointment with [address] [place] [address] yesterday. She states it's going well and she feels like it is helping. They have scheduled followup. She admits that she tried increasing her sertraline dose to 2.5 tabs (62.5 mg) but she started having tachycardia so she went back to 2 tabs. Review of Systems   Constitutional:  Positive for malaise/fatigue. Negative for fever. Eyes:  Negative for blurred vision. Respiratory:  Negative for cough and shortness of breath. Cardiovascular:  Negative for chest pain, palpitations and leg swelling. Gastrointestinal:  Negative for abdominal pain, constipation, diarrhea, nausea and vomiting. Neurological:  Negative for dizziness and headaches. Psychiatric/Behavioral:  Positive for depression. Negative for suicidal ideas. The patient does not have insomnia. There were no vitals taken for this visit.     Physical Exam - telephone encounter

## 2022-09-07 NOTE — PROGRESS NOTES
Check-out Note: Stop sertraline   Start bupropion XL one tab once daily; sent to [facility] to the pt. AMN # Int [id number]. The pt verified her name and . The pt was given the entire checkout note above and told the rx will cost $15 at the [facility] no coupon is needed. There are 2 refills on the rx. The rx was fully explained to the pt an she was told to stop the medicine Zoloft she had been taking, and start the new one Bupropion. She was told he could go  the medication in about an hour.  David Cunningham, RN
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
## 2022-10-12 ENCOUNTER — VIRTUAL VISIT (OUTPATIENT)
Dept: FAMILY MEDICINE CLINIC | Age: 37
End: 2022-10-12

## 2022-10-12 DIAGNOSIS — F33.0 MDD (MAJOR DEPRESSIVE DISORDER), RECURRENT EPISODE, MILD (HCC): Primary | ICD-10-CM

## 2022-10-12 PROCEDURE — 99213 OFFICE O/P EST LOW 20 MIN: CPT | Performed by: FAMILY MEDICINE

## 2022-10-12 RX ORDER — VENLAFAXINE HYDROCHLORIDE 75 MG/1
75 CAPSULE, EXTENDED RELEASE ORAL DAILY
Qty: 30 CAPSULE | Refills: 1 | Status: SHIPPED | OUTPATIENT
Start: 2022-10-12 | End: 2022-10-28 | Stop reason: SDUPTHER

## 2022-10-12 NOTE — PROGRESS NOTES
Tc to the pt for intake. Sonia Conti . The pt verified her name and .     160/95 and pulse was 78. A couple of days ago. Coordination of Care  1. Have you been to the ER, urgent care clinic since your last visit? Hospitalized since your last visit? No    2. Have you seen or consulted any other health care providers outside of the [address] [place] [address] since your last visit? Include any pap smears or colon screening. No    Does the patient need refills?  YES    Learning Assessment Complete? no  Depression Screening complete in the past 12 months? yes

## 2022-10-12 NOTE — PROGRESS NOTES
Discharge with Nathanael Goyal . Told pt that rx's have been sent to pharmacy and they should be ready for  in approximately 2 hrs. The medication was reviewed with the pt. Brandon Ram RN

## 2022-10-12 NOTE — PROGRESS NOTES
David Cortez is a 40 y.o. female   Chief Complaint   Patient presents with    Depression     Follow up.     >>>>>>>>>>>>>TELEPHONE ENCOUNTER<<<<<<<<<<<<<<<<<<<<      ASSESSMENT AND PLAN:    1. MDD (major depressive disorder), recurrent episode, mild (HCC)  PHQ9 up to 14 (was 9 last month, 23 initially)  Worsening after switching from zoloft to wellbutrin and bad news. Pt wishes to switch medications. Energy and motivation are the most prominent symptoms. Will start venlafaxine as it is one of the more activating anti-depressants. Follow up with me in one month, sooner if needed (worsening symptoms, medication side effects)  Has appointment with Jassi Wang in November. - venlafaxine-SR (EFFEXOR-XR) 75 mg capsule; Take 1 Capsule by mouth daily. Indications: major depressive disorder  Dispense: 30 Capsule; Refill: 1    Noted elevated BP after visit, patient has had elevations in the past, though her most recent office BP was WNL. May need medication. Will schedule face to face apointment for BP check. SUBJECTIVE:    HPI:  David Crotez is a 40 y.o. female who presents via telephone for depression. After her last visit 9/7 we switched from zoloft to wellbutrin because her progress had stalled, but an increased dose caused side effects. Patient reports that in the past month she has felt worse and then in the past 3 days she has felt very very bad. She heard that her grandmother is very sick. She is unable to travel to see her, but has been speaking with her on the phone. She has been feeling bad about herself. She has not had the energy to bathe herself and does not want to leave the house. She notes that she is eating well and sleeping normally. Review of Systems   Constitutional:  Positive for malaise/fatigue. Negative for fever. Eyes:  Negative for blurred vision. Respiratory:  Negative for cough and shortness of breath.     Cardiovascular:  Negative for chest pain, palpitations and leg swelling. Gastrointestinal:  Negative for abdominal pain, constipation, diarrhea, nausea and vomiting. Musculoskeletal:  Positive for myalgias. Neurological:  Negative for dizziness and headaches. Psychiatric/Behavioral:  Positive for depression. Negative for suicidal ideas. The patient does not have insomnia. There were no vitals taken for this visit. Physical Exam -- telephone encounter.

## 2022-10-28 ENCOUNTER — OFFICE VISIT (OUTPATIENT)
Dept: FAMILY MEDICINE CLINIC | Age: 37
End: 2022-10-28

## 2022-10-28 VITALS
SYSTOLIC BLOOD PRESSURE: 117 MMHG | HEIGHT: 63 IN | HEART RATE: 72 BPM | OXYGEN SATURATION: 98 % | TEMPERATURE: 98.1 F | BODY MASS INDEX: 30.83 KG/M2 | WEIGHT: 174 LBS | RESPIRATION RATE: 20 BRPM | DIASTOLIC BLOOD PRESSURE: 58 MMHG

## 2022-10-28 DIAGNOSIS — F33.0 MDD (MAJOR DEPRESSIVE DISORDER), RECURRENT EPISODE, MILD (HCC): Primary | ICD-10-CM

## 2022-10-28 DIAGNOSIS — M54.2 NECK PAIN: ICD-10-CM

## 2022-10-28 PROCEDURE — 99214 OFFICE O/P EST MOD 30 MIN: CPT | Performed by: FAMILY MEDICINE

## 2022-10-28 RX ORDER — CYCLOBENZAPRINE HCL 10 MG
10 TABLET ORAL
Qty: 20 TABLET | Refills: 0 | Status: SHIPPED | OUTPATIENT
Start: 2022-10-28

## 2022-10-28 RX ORDER — VENLAFAXINE HYDROCHLORIDE 75 MG/1
75 CAPSULE, EXTENDED RELEASE ORAL DAILY
Qty: 90 CAPSULE | Refills: 1 | Status: SHIPPED | OUTPATIENT
Start: 2022-10-28

## 2022-10-28 NOTE — PROGRESS NOTES
Chief Complaint   Patient presents with    Depression     F/up; pt states she feels better since switching to Effexor    Blood Pressure Check     Per last note, f/up for BP check     Visit Vitals  BP (!) 117/58 (BP 1 Location: Left upper arm, BP Patient Position: Sitting)   Pulse 72   Temp 98.1 °F (36.7 °C) (Oral)   Resp 20   Ht 5' 2.6\" (1.59 m)   Wt 174 lb (78.9 kg)   SpO2 98%   BMI 31.22 kg/m²       Coordination of Care  1. Have you been to the ER, urgent care clinic since your last visit? Hospitalized since your last visit? No    2. Have you seen or consulted any other health care providers outside of the [address] [place] [address] since your last visit? Include any pap smears or colon screening. No    Does the patient need refills? YES    Learning Assessment Complete?  yes  Depression Screening complete in the past 12 months? yes

## 2022-10-28 NOTE — PROGRESS NOTES
Ashok Pollack seen at d/c, full name and  verified. After Visit Summary provided and reviewed along with discharge instructions and when it is recommended to come back. Advised patient to call [facility] phone line to schedule follow up appointment in 3 months or sooner with acute concerns. Reviewed medication list with the patient to ensure she knows how to and when to take her medications. Side effects, mechanisms of action and medication compliance were reiterated to ensure proper understanding. Time for questions and answers provided, patient verbalizes understanding.

## 2022-10-28 NOTE — PROGRESS NOTES
Mj Schuler is a 40 y.o. female   Chief Complaint   Patient presents with    Depression     F/up; pt states she feels better since switching to Effexor    Blood Pressure Check     Per last note, f/up for BP check         ASSESSMENT AND PLAN:    1. MDD (major depressive disorder), recurrent episode, mild ([address]  PHQ9 is 3 today, down from 14 two weeks ago. No medications halina effects. Continue Effexoer. [facility] appt with Pao Ana Lilia 11/15  Follow up with me in 3 months, sooner with acute concerns or worsening of symptoms. - venlafaxine-SR (EFFEXOR-XR) 75 mg capsule; Take 1 Capsule by mouth daily. Indications: major depressive disorder  Dispense: 90 Capsule; Refill: 1    2. Neck pain  - cyclobenzaprine (FLEXERIL) 10 mg tablet; Take 1 Tablet by mouth nightly as needed for Muscle Spasm(s) (dolor de la nucha/ tension muscular). Dispense: 20 Tablet; Refill: 0      SUBJECTIVE:    HPI:  Mj Schuler is a 40 y.o. female who presents  for follow up. At her virtual visit 10/12 we switched to Effexor after her symptoms worsened after switching from zoloft to wellbutrin for a plateau-ing of improvement on zoloft. She had also mentioned an elevated BP to the 185B systolic. Today her BP is normal.  She reports the effexor has seemed to help. She is sleeping well. Her energy and motivation have returned. Her appetite has returned. Her self esteem has improved. She is able to concentrate at work. No thoughts of hurting herself. She does note some mild anxiety and feels like she is a little forgetful. She also notes upper neck/occipital pain that is worse when she is feeling stressed out. Her neck feels tense. Review of Systems   Constitutional:  Negative for fever and malaise/fatigue. Eyes:  Negative for blurred vision. Respiratory:  Negative for cough and shortness of breath. Cardiovascular:  Negative for chest pain, palpitations and leg swelling.    Gastrointestinal:  Negative for abdominal pain, constipation, diarrhea, nausea and vomiting. Musculoskeletal:  Positive for neck pain. Neurological:  Negative for dizziness, sensory change, focal weakness and headaches. Psychiatric/Behavioral:  Positive for depression and memory loss. Negative for substance abuse and suicidal ideas. The patient is nervous/anxious. The patient does not have insomnia. BP (!) 117/58 (BP 1 Location: Left upper arm, BP Patient Position: Sitting)   Pulse 72   Temp 98.1 °F (36.7 °C) (Oral)   Resp 20   Ht 5' 2.6\" (1.59 m)   Wt 174 lb (78.9 kg)   SpO2 98%   BMI 31.22 kg/m²     Physical Exam  Constitutional:       General: She is not in acute distress. Appearance: Normal appearance. Eyes:      Extraocular Movements: Extraocular movements intact. Conjunctiva/sclera: Conjunctivae normal.      Pupils: Pupils are equal, round, and reactive to light. Cardiovascular:      Rate and Rhythm: Normal rate and regular rhythm. Heart sounds: Normal heart sounds. Pulmonary:      Effort: Pulmonary effort is normal.      Breath sounds: Normal breath sounds. Musculoskeletal:      Cervical back: Tenderness (cervical paraspinals.) present. No rigidity. Lymphadenopathy:      Cervical: No cervical adenopathy. Neurological:      General: No focal deficit present. Mental Status: She is alert. Psychiatric:         Attention and Perception: Attention normal.         Mood and Affect: Mood and affect normal.         Speech: Speech normal.         Behavior: Behavior normal.         Thought Content:  Thought content normal.

## 2022-11-14 ENCOUNTER — TELEPHONE (OUTPATIENT)
Dept: FAMILY MEDICINE CLINIC | Age: 37
End: 2022-11-14

## 2023-03-08 ENCOUNTER — HOSPITAL ENCOUNTER (OUTPATIENT)
Facility: HOSPITAL | Age: 38
Setting detail: SPECIMEN
Discharge: HOME | End: 2023-03-11

## 2023-03-08 ENCOUNTER — OFFICE VISIT (OUTPATIENT)
Dept: FAMILY MEDICINE CLINIC | Age: 38
End: 2023-03-08

## 2023-03-08 VITALS
WEIGHT: 169 LBS | HEART RATE: 74 BPM | SYSTOLIC BLOOD PRESSURE: 134 MMHG | TEMPERATURE: 97.5 F | DIASTOLIC BLOOD PRESSURE: 77 MMHG | OXYGEN SATURATION: 100 % | BODY MASS INDEX: 30.32 KG/M2

## 2023-03-08 DIAGNOSIS — M54.50 CHRONIC BILATERAL LOW BACK PAIN WITHOUT SCIATICA: ICD-10-CM

## 2023-03-08 DIAGNOSIS — S76.011A STRAIN OF FLEXOR MUSCLE OF RIGHT HIP, INITIAL ENCOUNTER: ICD-10-CM

## 2023-03-08 DIAGNOSIS — Z12.4 CERVICAL CANCER SCREENING: ICD-10-CM

## 2023-03-08 DIAGNOSIS — G89.29 CHRONIC BILATERAL LOW BACK PAIN WITHOUT SCIATICA: ICD-10-CM

## 2023-03-08 DIAGNOSIS — F33.42 RECURRENT MAJOR DEPRESSIVE DISORDER, IN FULL REMISSION (HCC): Primary | ICD-10-CM

## 2023-03-08 PROCEDURE — 87624 HPV HI-RISK TYP POOLED RSLT: CPT

## 2023-03-08 PROCEDURE — 99214 OFFICE O/P EST MOD 30 MIN: CPT | Performed by: FAMILY MEDICINE

## 2023-03-08 PROCEDURE — 88175 CYTOPATH C/V AUTO FLUID REDO: CPT

## 2023-03-08 PROCEDURE — 87661 TRICHOMONAS VAGINALIS AMPLIF: CPT

## 2023-03-08 RX ORDER — CYCLOBENZAPRINE HCL 10 MG
10 TABLET ORAL
Qty: 14 TABLET | Refills: 0 | Status: SHIPPED | OUTPATIENT
Start: 2023-03-08 | End: 2023-03-22

## 2023-03-08 RX ORDER — NAPROXEN 500 MG/1
500 TABLET ORAL 2 TIMES DAILY WITH MEALS
Qty: 30 TABLET | Refills: 0 | Status: SHIPPED | OUTPATIENT
Start: 2023-03-08

## 2023-03-08 NOTE — PROGRESS NOTES
Chief Complaint   Patient presents with    Depression    Abdominal Pain    Other     Patient is also taking OTC vitamins called \"DoloNeurobion\"     Visit Vitals  /77 (BP 1 Location: Right arm, BP Patient Position: Sitting, BP Cuff Size: Adult)   Pulse 74   Temp 97.5 °F (36.4 °C) (Temporal)   Wt 169 lb (76.7 kg)   SpO2 100%   BMI 30.32 kg/m²       1. Have you been to the ER, urgent care clinic since your last visit? Hospitalized since your last visit? No    2. Have you seen or consulted any other health care providers outside of the [address] [place] [address] since your last visit? Include any pap smears or colon screening.  No

## 2023-03-08 NOTE — PROGRESS NOTES
via phone services: [phone]. Shivani Arteaga LPN    Ray Brandt seen at d/c, full name and  verified via . Melanie Andrew LPN     After Visit Summary provided and reviewed along with discharge instructions and when it is recommended to come back. Advised patient to schedule follow up appointment if her depression returns or if hip pain persists. Reviewed medication list with the patient to ensure she knows how to and when to take her medications. Side effects, mechanisms of action and medication compliance were reiterated to ensure proper understanding. Patient was given [place] [id number] coupons for medications to present to pharmacy. Time for questions and answers provided, patient verbalizes understanding.  Melanie Andrew LPN

## 2023-03-08 NOTE — PROGRESS NOTES
Donita Melendez is a 40 y.o. female   Chief Complaint   Patient presents with    Depression    Abdominal Pain    Other     Patient is also taking OTC vitamins called \"DoloNeurobion\"         ASSESSMENT AND PLAN:    1. Recurrent major depressive disorder, in full remission ([address]  She is feeling better. She has elected complete the remaining effexor she has at home, and then trial off meds. If her symptoms return, she'll contact the office to restart effexor. 2. Chronic bilateral low back pain without sciatica  - naproxen (NAPROSYN) 500 mg tablet; Take 1 Tablet by mouth two (2) times daily (with meals). Indications: pain  Dispense: 30 Tablet; Refill: 0  - cyclobenzaprine (FLEXERIL) 10 mg tablet; Take 1 Tablet by mouth nightly for 14 days. Dispense: 14 Tablet; Refill: 0    3. Strain of flexor muscle of right hip, initial encounter  No hernias palpated. - naproxen (NAPROSYN) 500 mg tablet; Take 1 Tablet by mouth two (2) times daily (with meals). Indications: pain  Dispense: 30 Tablet; Refill: 0    4. Cervical cancer screening  Specimen obtained and sent to lab for cotesting. Followup per ASCCP guidelines. - PAP IG, CT-NG-TV, APTIMA HPV AND RFX 34/58,97([id number]); Future      SUBJECTIVE:    HPI:  Donita Melendez is a 40 y.o. female who presents for followup. She started Effexor XR on 10/12 after only partial response with zoloft and bupropion. It has worked well and her symptoms have improved. She still has pills at home but is interested in a trial off of medications once she completes them. It will have been about 6 months of therapy. She cancelled her appt with Stuart John since she is feeling good. She is having pain in her right groin for about 2-4 weeks. She has not noticed any lumps. It is worsened with walking and certain movements. It hurst to lie on that side. She continues to have LBP. She had a compression fracture about 6 years ago that has been stable.   She has been taking doloneurobion and sometime ibuprofen which does calm the pain. Her last pap was 7 years ago. Review of Systems   Constitutional:  Negative for fever and malaise/fatigue. Eyes:  Negative for blurred vision. Respiratory:  Negative for cough and shortness of breath. Cardiovascular:  Negative for chest pain, palpitations and leg swelling. Gastrointestinal:  Negative for abdominal pain, constipation, diarrhea, nausea and vomiting. Genitourinary: Negative. Musculoskeletal:  Positive for back pain and joint pain. Neurological:  Negative for dizziness and headaches. Psychiatric/Behavioral:  Negative for depression. The patient is not nervous/anxious and does not have insomnia. /77 (BP 1 Location: Right arm, BP Patient Position: Sitting, BP Cuff Size: Adult)   Pulse 74   Temp 97.5 °F (36.4 °C) (Temporal)   Wt 169 lb (76.7 kg)   SpO2 100%   BMI 30.32 kg/m²     Physical Exam  Constitutional:       General: She is not in acute distress. Appearance: Normal appearance. Eyes:      Extraocular Movements: Extraocular movements intact. Conjunctiva/sclera: Conjunctivae normal.      Pupils: Pupils are equal, round, and reactive to light. Cardiovascular:      Rate and Rhythm: Normal rate and regular rhythm. Heart sounds: Normal heart sounds. Pulmonary:      Effort: Pulmonary effort is normal.      Breath sounds: Normal breath sounds. Musculoskeletal:      Lumbar back: Spasms and tenderness present. Right hip: Tenderness (along and medial to the right hip flexor. No suprapubic pain.) present. Neurological:      Mental Status: She is alert.

## 2023-03-21 ENCOUNTER — TELEPHONE (OUTPATIENT)
Dept: FAMILY MEDICINE CLINIC | Age: 38
End: 2023-03-21

## 2023-03-21 DIAGNOSIS — R87.618 PAP SMEAR ABNORMALITY OF CERVIX/HUMAN PAPILLOMAVIRUS (HPV) POSITIVE: Primary | ICD-10-CM

## 2023-03-21 NOTE — TELEPHONE ENCOUNTER
I called the pt with the  [facility]. I was calling to give her the message regarding her PAP smear, and HPV results. I called the pt 2x. The pt did not answer. She was left messages.  Jeronimo Munroe RN

## 2023-03-21 NOTE — TELEPHONE ENCOUNTER
1. Pap smear abnormality of cervix/human papillomavirus (HPV) positive  HPV+ (negative 16,18,45), normal cytology. Repeat cotesting in one year. Negative STI testing.

## 2023-09-26 ENCOUNTER — OFFICE VISIT (OUTPATIENT)
Age: 38
End: 2023-09-26

## 2023-09-26 VITALS
TEMPERATURE: 97.7 F | DIASTOLIC BLOOD PRESSURE: 84 MMHG | HEART RATE: 70 BPM | HEIGHT: 61 IN | OXYGEN SATURATION: 99 % | RESPIRATION RATE: 20 BRPM | BODY MASS INDEX: 34.36 KG/M2 | SYSTOLIC BLOOD PRESSURE: 132 MMHG | WEIGHT: 182 LBS

## 2023-09-26 DIAGNOSIS — R10.11 RUQ PAIN: Primary | ICD-10-CM

## 2023-09-26 DIAGNOSIS — S76.011A STRAIN OF FLEXOR MUSCLE OF RIGHT HIP, INITIAL ENCOUNTER: ICD-10-CM

## 2023-09-26 PROCEDURE — 99214 OFFICE O/P EST MOD 30 MIN: CPT | Performed by: FAMILY MEDICINE

## 2023-09-26 SDOH — ECONOMIC STABILITY: HOUSING INSECURITY
IN THE LAST 12 MONTHS, WAS THERE A TIME WHEN YOU DID NOT HAVE A STEADY PLACE TO SLEEP OR SLEPT IN A SHELTER (INCLUDING NOW)?: NO

## 2023-09-26 SDOH — SOCIAL STABILITY: SOCIAL INSECURITY: WITHIN THE LAST YEAR, HAVE YOU BEEN AFRAID OF YOUR PARTNER OR EX-PARTNER?: NO

## 2023-09-26 SDOH — SOCIAL STABILITY: SOCIAL INSECURITY
WITHIN THE LAST YEAR, HAVE TO BEEN RAPED OR FORCED TO HAVE ANY KIND OF SEXUAL ACTIVITY BY YOUR PARTNER OR EX-PARTNER?: NO

## 2023-09-26 SDOH — ECONOMIC STABILITY: INCOME INSECURITY: IN THE LAST 12 MONTHS, WAS THERE A TIME WHEN YOU WERE NOT ABLE TO PAY THE MORTGAGE OR RENT ON TIME?: NO

## 2023-09-26 SDOH — ECONOMIC STABILITY: TRANSPORTATION INSECURITY
IN THE PAST 12 MONTHS, HAS THE LACK OF TRANSPORTATION KEPT YOU FROM MEDICAL APPOINTMENTS OR FROM GETTING MEDICATIONS?: NO

## 2023-09-26 SDOH — HEALTH STABILITY: MENTAL HEALTH: HOW OFTEN DO YOU HAVE A DRINK CONTAINING ALCOHOL?: NEVER

## 2023-09-26 SDOH — ECONOMIC STABILITY: FOOD INSECURITY: WITHIN THE PAST 12 MONTHS, YOU WORRIED THAT YOUR FOOD WOULD RUN OUT BEFORE YOU GOT MONEY TO BUY MORE.: NEVER TRUE

## 2023-09-26 SDOH — SOCIAL STABILITY: SOCIAL INSECURITY: WITHIN THE LAST YEAR, HAVE YOU BEEN HUMILIATED OR EMOTIONALLY ABUSED IN OTHER WAYS BY YOUR PARTNER OR EX-PARTNER?: NO

## 2023-09-26 SDOH — SOCIAL STABILITY: SOCIAL INSECURITY
WITHIN THE LAST YEAR, HAVE YOU BEEN KICKED, HIT, SLAPPED, OR OTHERWISE PHYSICALLY HURT BY YOUR PARTNER OR EX-PARTNER?: NO

## 2023-09-26 SDOH — HEALTH STABILITY: MENTAL HEALTH: HOW MANY STANDARD DRINKS CONTAINING ALCOHOL DO YOU HAVE ON A TYPICAL DAY?: PATIENT DOES NOT DRINK

## 2023-09-26 SDOH — ECONOMIC STABILITY: FOOD INSECURITY: WITHIN THE PAST 12 MONTHS, THE FOOD YOU BOUGHT JUST DIDN'T LAST AND YOU DIDN'T HAVE MONEY TO GET MORE.: NEVER TRUE

## 2023-09-26 SDOH — ECONOMIC STABILITY: TRANSPORTATION INSECURITY
IN THE PAST 12 MONTHS, HAS LACK OF TRANSPORTATION KEPT YOU FROM MEETINGS, WORK, OR FROM GETTING THINGS NEEDED FOR DAILY LIVING?: NO

## 2023-09-26 ASSESSMENT — PATIENT HEALTH QUESTIONNAIRE - PHQ9
SUM OF ALL RESPONSES TO PHQ QUESTIONS 1-9: 4
SUM OF ALL RESPONSES TO PHQ9 QUESTIONS 1 & 2: 4
SUM OF ALL RESPONSES TO PHQ QUESTIONS 1-9: 4
1. LITTLE INTEREST OR PLEASURE IN DOING THINGS: 2
SUM OF ALL RESPONSES TO PHQ QUESTIONS 1-9: 4
2. FEELING DOWN, DEPRESSED OR HOPELESS: 2
SUM OF ALL RESPONSES TO PHQ QUESTIONS 1-9: 4

## 2023-09-26 ASSESSMENT — ENCOUNTER SYMPTOMS
DIARRHEA: 0
ABDOMINAL DISTENTION: 1
CONSTIPATION: 1
COUGH: 0
ABDOMINAL PAIN: 1
BLOOD IN STOOL: 0
NAUSEA: 0
SHORTNESS OF BREATH: 0

## 2023-09-26 NOTE — PROGRESS NOTES
Name and  confirmed w/ patient. An After Visit Summary was provided and all discharge instructions were reviewed with the patient including: where to get labs drawn, imaging locations, and care card process. RN attempted to assist patient in making an ultrasound appt but was unable to get through the central scheduling line. Advised patient that we will call in the next few days with her appt time. She prefers [employer] on a Thursday and can only receive phone calls after work (3:30pm). She will need a f/up appt scheduled after her ultrasound. Time for questions and answers provided, patient verbalized understanding. Patient discharged from clinic in stable condition. STANTON  [facility] assisted with interpretation.

## 2023-09-26 NOTE — PROGRESS NOTES
Due to language barrier, an  was present during the history-taking and subsequent discussion with this patient. [facility] [id number]  Coordination of Care  1. Have you been to the ER, urgent care clinic since your last visit? Hospitalized since your last visit? 2. Have you seen or consulted any other health care providers outside of the [address] [place] [address] since your last visit? Include any pap smears or colon screening. Does the patient need refills? Learning Assessment Complete? Depression Screening complete in the past 12 months?

## 2023-09-27 ENCOUNTER — HOSPITAL ENCOUNTER (OUTPATIENT)
Facility: HOSPITAL | Age: 38
Setting detail: SPECIMEN
Discharge: HOME | End: 2023-09-30

## 2023-09-27 LAB
ALBUMIN SERPL-MCNC: 3.9 G/DL (ref 3.5–5)
ALBUMIN/GLOB SERPL: 1.1 (ref 1.1–2.2)
ALP SERPL-CCNC: 108 U/L (ref 45–117)
ALT SERPL-CCNC: 24 U/L (ref 12–78)
ANION GAP SERPL CALC-SCNC: 2 MMOL/L (ref 5–15)
AST SERPL-CCNC: 13 U/L (ref 15–37)
BILIRUB SERPL-MCNC: 0.4 MG/DL (ref 0.2–1)
BUN SERPL-MCNC: 12 MG/DL (ref 6–20)
BUN/CREAT SERPL: 21 (ref 12–20)
CALCIUM SERPL-MCNC: 9.2 MG/DL (ref 8.5–10.1)
CHLORIDE SERPL-SCNC: 109 MMOL/L (ref 97–108)
CO2 SERPL-SCNC: 30 MMOL/L (ref 21–32)
CREAT SERPL-MCNC: 0.56 MG/DL (ref 0.55–1.02)
GLOBULIN SER CALC-MCNC: 3.4 G/DL (ref 2–4)
GLUCOSE SERPL-MCNC: 85 MG/DL (ref 65–100)
LIPASE SERPL-CCNC: 158 U/L (ref 73–393)
POTASSIUM SERPL-SCNC: 4.4 MMOL/L (ref 3.5–5.1)
PROT SERPL-MCNC: 7.3 G/DL (ref 6.4–8.2)
SODIUM SERPL-SCNC: 141 MMOL/L (ref 136–145)

## 2023-09-27 PROCEDURE — 83690 ASSAY OF LIPASE: CPT

## 2023-09-27 PROCEDURE — 36415 COLL VENOUS BLD VENIPUNCTURE: CPT

## 2023-09-27 PROCEDURE — 80053 COMPREHEN METABOLIC PANEL: CPT

## 2023-10-05 ENCOUNTER — HOSPITAL ENCOUNTER (OUTPATIENT)
Facility: HOSPITAL | Age: 38
Discharge: HOME | End: 2023-10-05
Attending: FAMILY MEDICINE

## 2023-10-05 DIAGNOSIS — R10.11 RUQ PAIN: ICD-10-CM

## 2023-10-05 PROCEDURE — 76705 ECHO EXAM OF ABDOMEN: CPT

## 2023-10-09 DIAGNOSIS — K76.9 LESION OF RIGHT LOBE OF LIVER: Primary | ICD-10-CM

## 2023-10-09 NOTE — RESULT ENCOUNTER NOTE
Her ultrasound showed a few lesions on her liver that we need to look at more closely with an CT scan in order to classify them. Please help her schedule the CT    Otherwise, her ultrasound was normal - no gallstones. Normal gallbladder. Normal pancreas. Normal kidneys. Thank you.

## 2023-10-12 ENCOUNTER — TELEPHONE (OUTPATIENT)
Age: 38
End: 2023-10-12

## 2023-10-26 ENCOUNTER — HOSPITAL ENCOUNTER (OUTPATIENT)
Facility: HOSPITAL | Age: 38
Discharge: HOME | End: 2023-10-26
Attending: FAMILY MEDICINE

## 2023-10-26 DIAGNOSIS — K76.9 LESION OF RIGHT LOBE OF LIVER: ICD-10-CM

## 2023-10-26 PROCEDURE — 74178 CT ABD&PLV WO CNTR FLWD CNTR: CPT

## 2023-10-26 PROCEDURE — 6360000004 HC RX CONTRAST MEDICATION: Performed by: FAMILY MEDICINE

## 2023-10-26 RX ADMIN — IOPAMIDOL 100 ML: 755 INJECTION, SOLUTION INTRAVENOUS at 14:19

## 2023-10-27 NOTE — RESULT ENCOUNTER NOTE
Please schedule patient for a follow up appointment to review their CT results - as soon as available - ok to use transitional care or acute care slot. Thanks.    -----  Hepatic steatosis with possible cirrhosis  Celiac a stenosis  Constipation/fecal stasis.

## 2023-11-08 ENCOUNTER — OFFICE VISIT (OUTPATIENT)
Age: 38
End: 2023-11-08

## 2023-11-08 VITALS
TEMPERATURE: 98.1 F | RESPIRATION RATE: 16 BRPM | WEIGHT: 179 LBS | SYSTOLIC BLOOD PRESSURE: 140 MMHG | BODY MASS INDEX: 33.79 KG/M2 | DIASTOLIC BLOOD PRESSURE: 82 MMHG | OXYGEN SATURATION: 99 % | HEIGHT: 61 IN | HEART RATE: 76 BPM

## 2023-11-08 DIAGNOSIS — K59.00 CONSTIPATION, UNSPECIFIED CONSTIPATION TYPE: ICD-10-CM

## 2023-11-08 DIAGNOSIS — R10.13 EPIGASTRIC PAIN: ICD-10-CM

## 2023-11-08 DIAGNOSIS — L30.1 DYSHIDROTIC ECZEMA: ICD-10-CM

## 2023-11-08 DIAGNOSIS — Z71.89 COUNSELING AND COORDINATION OF CARE: Primary | ICD-10-CM

## 2023-11-08 DIAGNOSIS — K76.0 HEPATIC STEATOSIS: Primary | ICD-10-CM

## 2023-11-08 DIAGNOSIS — J30.2 SEASONAL ALLERGIC RHINITIS, UNSPECIFIED TRIGGER: ICD-10-CM

## 2023-11-08 PROCEDURE — 99215 OFFICE O/P EST HI 40 MIN: CPT | Performed by: FAMILY MEDICINE

## 2023-11-08 RX ORDER — DOCUSATE SODIUM 100 MG/1
100 CAPSULE, LIQUID FILLED ORAL 2 TIMES DAILY
Qty: 60 CAPSULE | Refills: 0 | Status: SHIPPED | OUTPATIENT
Start: 2023-11-08 | End: 2023-12-08

## 2023-11-08 RX ORDER — CHLORAL HYDRATE 500 MG
2000 CAPSULE ORAL 2 TIMES DAILY
Qty: 360 CAPSULE | Refills: 1 | Status: SHIPPED | OUTPATIENT
Start: 2023-11-08

## 2023-11-08 RX ORDER — CETIRIZINE HYDROCHLORIDE 10 MG/1
10 TABLET ORAL DAILY
Qty: 30 TABLET | Refills: 0 | Status: SHIPPED | OUTPATIENT
Start: 2023-11-08 | End: 2023-12-08

## 2023-11-08 RX ORDER — FAMOTIDINE 20 MG/1
20 TABLET, FILM COATED ORAL 2 TIMES DAILY
Qty: 60 TABLET | Refills: 3 | Status: SHIPPED | OUTPATIENT
Start: 2023-11-08

## 2023-11-08 SDOH — ECONOMIC STABILITY: FOOD INSECURITY: WITHIN THE PAST 12 MONTHS, YOU WORRIED THAT YOUR FOOD WOULD RUN OUT BEFORE YOU GOT MONEY TO BUY MORE.: NEVER TRUE

## 2023-11-08 SDOH — ECONOMIC STABILITY: INCOME INSECURITY: HOW HARD IS IT FOR YOU TO PAY FOR THE VERY BASICS LIKE FOOD, HOUSING, MEDICAL CARE, AND HEATING?: NOT HARD AT ALL

## 2023-11-08 SDOH — ECONOMIC STABILITY: FOOD INSECURITY: WITHIN THE PAST 12 MONTHS, THE FOOD YOU BOUGHT JUST DIDN'T LAST AND YOU DIDN'T HAVE MONEY TO GET MORE.: NEVER TRUE

## 2023-11-08 ASSESSMENT — ENCOUNTER SYMPTOMS
SHORTNESS OF BREATH: 0
COUGH: 0
ABDOMINAL PAIN: 1
NAUSEA: 0
DIARRHEA: 0
CONSTIPATION: 1

## 2023-11-08 NOTE — PROGRESS NOTES
Lab requisition slip printed and given to patient for lab work ordered today by provider. Lab draw locations given to patient. Pt was instructed to fast at least 8 hrs prior to blood draw. Pt verbalized understanding.  Juan Pool RN

## 2023-11-08 NOTE — PROGRESS NOTES
Patient was a walk-in for assistance with medical bills. ORW has informed patient of documentation needed to complete BS [employer] application. Patient stated she will call when she has pay stubs needed. ORW has provided telephone number for main [facility] main office and adivised patient to call and leave a message and some one will call her back.

## 2023-11-15 ENCOUNTER — HOSPITAL ENCOUNTER (OUTPATIENT)
Facility: HOSPITAL | Age: 38
Setting detail: SPECIMEN
Discharge: HOME | End: 2023-11-18

## 2023-11-15 LAB
COMMENT:: NORMAL
FERRITIN SERPL-MCNC: 70 NG/ML (ref 8–252)
HBV SURFACE AG SER QL: <0.1 INDEX
HBV SURFACE AG SER QL: NEGATIVE
HCV AB SER IA-ACNC: 0.08 INDEX
HCV AB SERPL QL IA: NONREACTIVE
IRON SATN MFR SERPL: 24 % (ref 20–50)
IRON SERPL-MCNC: 80 UG/DL (ref 35–150)
SPECIMEN HOLD: NORMAL
TIBC SERPL-MCNC: 331 UG/DL (ref 250–450)

## 2023-11-16 LAB — HBV CORE AB SERPL QL IA: NEGATIVE

## 2023-11-17 LAB
A2 MACROGLOB SERPL-MCNC: 152 MG/DL (ref 110–276)
ALT SERPL-CCNC: 17 IU/L (ref 0–40)
ANA SER QL: NEGATIVE
APO A-I SERPL-MCNC: 178 MG/DL (ref 116–209)
AST SERPL W P-5'-P-CCNC: 19 IU/L (ref 0–40)
BILIRUB SERPL-MCNC: 0.2 MG/DL (ref 0–1.2)
CERULOPLASMIN SERPL-MCNC: 31.8 MG/DL (ref 19–39)
CHOLEST SERPL-MCNC: 211 MG/DL (ref 100–199)
FIBROSIS SCORING:: ABNORMAL
FIBROSIS STAGE SERPL QL: ABNORMAL
GGT SERPL-CCNC: 39 IU/L (ref 0–60)
GLUCOSE SERPL-MCNC: 91 MG/DL (ref 70–99)
HAPTOGLOB SERPL-MCNC: 249 MG/DL (ref 33–278)
INTERPRETATION: ABNORMAL
LABORATORY COMMENT REPORT: ABNORMAL
LIVER FIBR SCORE SERPL CALC.FIBROSURE: 0.02 (ref 0–0.21)
Lab: ABNORMAL
NASH - STEATOSIS GRADE: ABNORMAL
NASH - STEATOSIS GRADING: ABNORMAL
NASH - STEATOSIS SCORE: 0.46 (ref 0–0.4)
NASH SCORING: ABNORMAL
NECROINFLAMMATORY ACT GRADE SERPL QL: ABNORMAL
NECROINFLAMMATORY ACT SCORE SERPL: 0.2 (ref 0–0.25)
SERVICE CMNT-IMP: ABNORMAL
SMA IGG SER-ACNC: 5 UNITS (ref 0–19)
TRIGL SERPL-MCNC: 227 MG/DL (ref 0–149)

## 2023-11-17 NOTE — RESULT ENCOUNTER NOTE
Negative Hep B and Hep C screening  Normal Ferritin, Iron, TIBC, ceruloplasmin. Negative STACEY, ASMA    VICK Fibrosure: F0 (no fibrosis)  S1- Mild steatosis  VICK score N0 (= no Gearlean Spruce)  (This is not consistent with imaging studies)    Will discuss with pt at followup in December.

## 2023-11-22 ENCOUNTER — TELEPHONE (OUTPATIENT)
Age: 38
End: 2023-11-22

## 2023-11-22 NOTE — TELEPHONE ENCOUNTER
Financial screening started for medical bills (BS FA application). An appointment has been made for 90/89/38 to complete application. Pending POI.

## 2023-11-28 ENCOUNTER — OFFICE VISIT (OUTPATIENT)
Age: 38
End: 2023-11-28

## 2023-11-28 DIAGNOSIS — Z71.89 COUNSELING AND COORDINATION OF CARE: Primary | ICD-10-CM

## 2023-11-28 PROCEDURE — 99080 SPECIAL REPORTS OR FORMS: CPT | Performed by: PHYSICIAN ASSISTANT

## 2023-12-14 ENCOUNTER — OFFICE VISIT (OUTPATIENT)
Age: 38
End: 2023-12-14

## 2023-12-14 VITALS
RESPIRATION RATE: 20 BRPM | BODY MASS INDEX: 33.19 KG/M2 | HEIGHT: 61 IN | WEIGHT: 175.8 LBS | SYSTOLIC BLOOD PRESSURE: 138 MMHG | DIASTOLIC BLOOD PRESSURE: 87 MMHG | TEMPERATURE: 97.7 F | OXYGEN SATURATION: 98 % | HEART RATE: 74 BPM

## 2023-12-14 DIAGNOSIS — K74.69 OTHER CIRRHOSIS OF LIVER (HCC): ICD-10-CM

## 2023-12-14 DIAGNOSIS — R10.13 EPIGASTRIC PAIN: ICD-10-CM

## 2023-12-14 DIAGNOSIS — R10.11 RUQ PAIN: Primary | ICD-10-CM

## 2023-12-14 DIAGNOSIS — K59.00 CONSTIPATION, UNSPECIFIED CONSTIPATION TYPE: ICD-10-CM

## 2023-12-14 PROCEDURE — 99213 OFFICE O/P EST LOW 20 MIN: CPT | Performed by: FAMILY MEDICINE

## 2023-12-14 RX ORDER — SUCRALFATE 1 G/1
1 TABLET ORAL 4 TIMES DAILY
Qty: 120 TABLET | Refills: 3 | Status: SHIPPED | OUTPATIENT
Start: 2023-12-14

## 2023-12-14 RX ORDER — POLYETHYLENE GLYCOL 3350 17 G/17G
17 POWDER, FOR SOLUTION ORAL DAILY PRN
Qty: 510 G | Refills: 1 | Status: SHIPPED | OUTPATIENT
Start: 2023-12-14

## 2023-12-14 SDOH — ECONOMIC STABILITY: FOOD INSECURITY: WITHIN THE PAST 12 MONTHS, THE FOOD YOU BOUGHT JUST DIDN'T LAST AND YOU DIDN'T HAVE MONEY TO GET MORE.: NEVER TRUE

## 2023-12-14 SDOH — ECONOMIC STABILITY: INCOME INSECURITY: IN THE LAST 12 MONTHS, WAS THERE A TIME WHEN YOU WERE NOT ABLE TO PAY THE MORTGAGE OR RENT ON TIME?: NO

## 2023-12-14 SDOH — ECONOMIC STABILITY: HOUSING INSECURITY: IN THE LAST 12 MONTHS, HOW MANY PLACES HAVE YOU LIVED?: 1

## 2023-12-14 SDOH — ECONOMIC STABILITY: FOOD INSECURITY: WITHIN THE PAST 12 MONTHS, YOU WORRIED THAT YOUR FOOD WOULD RUN OUT BEFORE YOU GOT MONEY TO BUY MORE.: NEVER TRUE

## 2023-12-14 ASSESSMENT — PATIENT HEALTH QUESTIONNAIRE - PHQ9
2. FEELING DOWN, DEPRESSED OR HOPELESS: 0
SUM OF ALL RESPONSES TO PHQ QUESTIONS 1-9: 0
1. LITTLE INTEREST OR PLEASURE IN DOING THINGS: 0
SUM OF ALL RESPONSES TO PHQ QUESTIONS 1-9: 0
SUM OF ALL RESPONSES TO PHQ QUESTIONS 1-9: 0
SUM OF ALL RESPONSES TO PHQ9 QUESTIONS 1 & 2: 0
SUM OF ALL RESPONSES TO PHQ QUESTIONS 1-9: 0

## 2023-12-14 ASSESSMENT — LIFESTYLE VARIABLES
HOW MANY STANDARD DRINKS CONTAINING ALCOHOL DO YOU HAVE ON A TYPICAL DAY: PATIENT DOES NOT DRINK
HOW OFTEN DO YOU HAVE A DRINK CONTAINING ALCOHOL: NEVER

## 2023-12-14 NOTE — PROGRESS NOTES
Chief Complaint   Patient presents with    Abdominal Pain     Follow up hepatic steatosis, epigastric pain; patient states her pain was doing better, but started up again (constant, RUQ) about two weeks ago; still having some constipation as well         Coordination of Care  1. Have you been to the ER, urgent care clinic since your last visit? Hospitalized since your last visit? No    2. Have you seen or consulted any other health care providers outside of the [address] [place] [address] since your last visit? Include any pap smears or colon screening. No  Does the patient need refills? No    Learning Assessment Complete?  yes  Depression Screening complete in the past 12 months? yes

## 2023-12-14 NOTE — PROGRESS NOTES
Robby Ramirez Oz:  Neelam Johnson. Gilberto Easton LPN    Patient name and date of birth verified by . Patient given an after visit summary, reviewed medications  on how and when to take, coupons given to present to pharmacy for medication discount. Advised to schedule next appointment before leaving clinic office. Advised that she will receive a call to complete a screening and scheduling of appointment for gastro ( stomach ) doctor via access now. Patient verbalized understanding of all information given at time of visit.  Gilberto Easton LPN

## 2024-01-02 ENCOUNTER — OFFICE VISIT (OUTPATIENT)
Age: 39
End: 2024-01-02

## 2024-01-02 DIAGNOSIS — Z71.89 COUNSELING AND COORDINATION OF CARE: Primary | ICD-10-CM

## 2024-01-02 PROCEDURE — 99080 SPECIAL REPORTS OR FORMS: CPT | Performed by: PHYSICIAN ASSISTANT

## 2024-01-02 NOTE — PROGRESS NOTES
Access [employer] application has been filled out and signed. Pending missing information. Patient will call CHW to provide missing information (employer's information and sister's phone number).

## 2024-01-04 ENCOUNTER — TELEPHONE (OUTPATIENT)
Age: 39
End: 2024-01-04

## 2024-01-04 NOTE — TELEPHONE ENCOUNTER
CHW received some text messages from patient with pending information.   Patient's Access Now application has been completed and given to RN at [facility].   CHW instructed patient to call [employer] on / after 1/18/23 to confirm her referral.   CHW sent [employer] phone number and business hours to patient via text message. Patient confirmed that she received and understood the information.

## 2024-01-31 ENCOUNTER — OFFICE VISIT (OUTPATIENT)
Age: 39
End: 2024-01-31

## 2024-01-31 VITALS
OXYGEN SATURATION: 98 % | DIASTOLIC BLOOD PRESSURE: 76 MMHG | WEIGHT: 176 LBS | SYSTOLIC BLOOD PRESSURE: 119 MMHG | BODY MASS INDEX: 33.23 KG/M2 | HEART RATE: 76 BPM | TEMPERATURE: 97.7 F

## 2024-01-31 DIAGNOSIS — K74.69 OTHER CIRRHOSIS OF LIVER (HCC): Primary | ICD-10-CM

## 2024-01-31 DIAGNOSIS — R10.13 EPIGASTRIC PAIN: ICD-10-CM

## 2024-01-31 PROCEDURE — 99213 OFFICE O/P EST LOW 20 MIN: CPT | Performed by: FAMILY MEDICINE

## 2024-01-31 RX ORDER — VITAMIN E 268 MG
400 CAPSULE ORAL 2 TIMES DAILY
Qty: 180 CAPSULE | Refills: 1 | Status: SHIPPED | OUTPATIENT
Start: 2024-01-31

## 2024-01-31 RX ORDER — SUCRALFATE 1 G/1
1 TABLET ORAL 4 TIMES DAILY
Qty: 120 TABLET | Refills: 3 | Status: SHIPPED | OUTPATIENT
Start: 2024-01-31

## 2024-01-31 ASSESSMENT — ENCOUNTER SYMPTOMS
COUGH: 0
SHORTNESS OF BREATH: 0
ABDOMINAL PAIN: 1
CONSTIPATION: 0
DIARRHEA: 0
NAUSEA: 0

## 2024-01-31 ASSESSMENT — PATIENT HEALTH QUESTIONNAIRE - PHQ9
SUM OF ALL RESPONSES TO PHQ QUESTIONS 1-9: 2
2. FEELING DOWN, DEPRESSED OR HOPELESS: 1
SUM OF ALL RESPONSES TO PHQ QUESTIONS 1-9: 2
1. LITTLE INTEREST OR PLEASURE IN DOING THINGS: 1
SUM OF ALL RESPONSES TO PHQ9 QUESTIONS 1 & 2: 2

## 2024-01-31 NOTE — PROGRESS NOTES
Yeni Zaidi is a 38 y.o. female   Chief Complaint   Patient presents with   • Hepatic steatosis     F/u for liver disease         ASSESSMENT AND PLAN:    1. Other cirrhosis of liver (HCC)  Continue lifestyle management.  Will add Vit E for fibrosis reduction.    - vitamin E 400 UNIT capsule; Take 1 capsule by mouth 2 times daily  Dispense: 180 capsule; Refill: 1    2. Epigastric pain  Continue sucralfate, was effective.  - sucralfate (CARAFATE) 1 GM tablet; Take 1 tablet by mouth 4 times daily  Dispense: 120 tablet; Refill: 3        SUBJECTIVE:    HPI:  Yeni Zaidi is a 38 y.o. female who presents for followup. Per fibrosure she has mild cirrhosis, secondary to VICK.  She is still having some epigastric burning, but it is better.  She is still having some constipation.  No nausea.    Normal urination.    Has GI appointment scheduled for May.    No new concerns.      Review of Systems   Constitutional:  Negative for fatigue, fever and unexpected weight change.   Eyes:  Negative for visual disturbance.   Respiratory:  Negative for cough and shortness of breath.    Cardiovascular:  Negative for chest pain and palpitations.   Gastrointestinal:  Positive for abdominal pain. Negative for constipation, diarrhea and nausea.   Neurological:  Negative for dizziness and headaches.       /76 (Site: Left Upper Arm, Position: Sitting)   Pulse 76   Temp 97.7 °F (36.5 °C) (Temporal)   Wt 79.8 kg (176 lb)   SpO2 98%   BMI 33.23 kg/m²     Physical Exam  Constitutional:       General: She is not in acute distress.     Appearance: Normal appearance.   HENT:      Mouth/Throat:      Mouth: Mucous membranes are moist.      Pharynx: Oropharynx is clear. No oropharyngeal exudate or posterior oropharyngeal erythema.   Eyes:      Extraocular Movements: Extraocular movements intact.      Conjunctiva/sclera: Conjunctivae normal.      Pupils: Pupils are equal, round, and reactive to light.   Cardiovascular:      Rate and Rhythm:

## 2024-01-31 NOTE — PROGRESS NOTES
Patient discharged with AVS. Patient's name and  verified. Patient made aware of prescriptions sent to the pharmacy. Medication review completed. Coupons given for the pharmacy. Patient instructed to schedule an appointment to return in 3 months. Patient given an opportunity to voice questions/concerns. All questions addressed. CVAN  David Sen assisted.

## 2024-01-31 NOTE — PROGRESS NOTES
\"Have you been to the ER, urgent care clinic since your last visit?  Hospitalized since your last visit?\"    NO    “Have you seen or consulted any other health care providers outside of [facility] since your last visit?”    NO

## 2024-06-17 PROCEDURE — 87086 URINE CULTURE/COLONY COUNT: CPT

## 2024-06-18 ENCOUNTER — HOSPITAL ENCOUNTER (OUTPATIENT)
Facility: HOSPITAL | Age: 39
Setting detail: SPECIMEN
Discharge: HOME | End: 2024-06-21

## 2024-06-18 DIAGNOSIS — N30.00 ACUTE CYSTITIS WITHOUT HEMATURIA: ICD-10-CM

## 2024-06-18 LAB
BACTERIA SPEC CULT: ABNORMAL
CC UR VC: ABNORMAL
COMMENT:: NORMAL
SERVICE CMNT-IMP: ABNORMAL
SPECIMEN HOLD: NORMAL

## 2024-09-16 ENCOUNTER — HOSPITAL ENCOUNTER (OUTPATIENT)
Facility: HOSPITAL | Age: 39
Discharge: HOME | End: 2024-09-19
Attending: INTERNAL MEDICINE

## 2024-09-16 DIAGNOSIS — K74.60 HEPATIC CIRRHOSIS, UNSPECIFIED HEPATIC CIRRHOSIS TYPE, UNSPECIFIED WHETHER ASCITES PRESENT (HCC): ICD-10-CM

## 2024-09-16 PROCEDURE — 76700 US EXAM ABDOM COMPLETE: CPT

## 2025-02-03 ENCOUNTER — APPOINTMENT (OUTPATIENT)
Facility: HOSPITAL | Age: 40
End: 2025-02-03

## 2025-02-03 ENCOUNTER — HOSPITAL ENCOUNTER (EMERGENCY)
Facility: HOSPITAL | Age: 40
Discharge: HOME | End: 2025-02-03
Attending: STUDENT IN AN ORGANIZED HEALTH CARE EDUCATION/TRAINING PROGRAM

## 2025-02-03 VITALS
HEART RATE: 85 BPM | RESPIRATION RATE: 18 BRPM | SYSTOLIC BLOOD PRESSURE: 152 MMHG | TEMPERATURE: 97.2 F | WEIGHT: 160.5 LBS | DIASTOLIC BLOOD PRESSURE: 83 MMHG | BODY MASS INDEX: 28.44 KG/M2 | HEIGHT: 63 IN | OXYGEN SATURATION: 98 %

## 2025-02-03 DIAGNOSIS — S99.922A FOOT INJURY, LEFT, INITIAL ENCOUNTER: Primary | ICD-10-CM

## 2025-02-03 PROCEDURE — 6370000000 HC RX 637 (ALT 250 FOR IP): Performed by: STUDENT IN AN ORGANIZED HEALTH CARE EDUCATION/TRAINING PROGRAM

## 2025-02-03 PROCEDURE — 99283 EMERGENCY DEPT VISIT LOW MDM: CPT

## 2025-02-03 PROCEDURE — 73630 X-RAY EXAM OF FOOT: CPT

## 2025-02-03 RX ORDER — IBUPROFEN 400 MG/1
400 TABLET, FILM COATED ORAL
Status: COMPLETED | OUTPATIENT
Start: 2025-02-03 | End: 2025-02-03

## 2025-02-03 RX ADMIN — IBUPROFEN 400 MG: 400 TABLET, FILM COATED ORAL at 16:09

## 2025-02-03 ASSESSMENT — PAIN - FUNCTIONAL ASSESSMENT
PAIN_FUNCTIONAL_ASSESSMENT: PREVENTS OR INTERFERES SOME ACTIVE ACTIVITIES AND ADLS
PAIN_FUNCTIONAL_ASSESSMENT: 0-10

## 2025-02-03 ASSESSMENT — PAIN DESCRIPTION - LOCATION: LOCATION: FOOT

## 2025-02-03 ASSESSMENT — PAIN SCALES - GENERAL: PAINLEVEL_OUTOF10: 5

## 2025-02-03 ASSESSMENT — PAIN DESCRIPTION - DESCRIPTORS: DESCRIPTORS: ACHING

## 2025-02-03 ASSESSMENT — PAIN DESCRIPTION - ORIENTATION: ORIENTATION: LEFT

## 2025-02-03 NOTE — ED PROVIDER NOTES
[facility] EMERGENCY DEPARTMENT  EMERGENCY DEPARTMENT ENCOUNTER      Pt Name: Yeni Zaidi  MRN: [id number]  Birthdate 1985  Date of evaluation: 2/3/2025  Provider: Deshaun Cobb DO    CHIEF COMPLAINT       Chief Complaint   Patient presents with    Foot Pain         HISTORY OF PRESENT ILLNESS   (Location/Symptom, Timing/Onset, Context/Setting, Quality, Duration, Modifying Factors, Severity)  Note limiting factors.   39-year-old female presenting today secondary to left foot pain.  She states that she was at work this morning when a forklift knocked over a stack of boxes that landed on her left foot.  She has pain to the lateral aspect of the left foot.  No ankle pain.  Pain is worse when trying to ambulate.  Moderate in degree at this time.  Has not take anything for pain.  No other complaints or injuries at this time.    A  was used.         Review of External Medical Records:     Nursing Notes were reviewed.    REVIEW OF SYSTEMS    (2-9 systems for level 4, 10 or more for level 5)     Review of Systems   Musculoskeletal:  Positive for arthralgias.       Except as noted above the remainder of the review of systems was reviewed and negative.       PAST MEDICAL HISTORY   No past medical history on file.      SURGICAL HISTORY     No past surgical history on file.      CURRENT MEDICATIONS       Discharge Medication List as of 2/3/2025  4:52 PM        CONTINUE these medications which have NOT CHANGED    Details   vitamin E 400 UNIT capsule Take 1 capsule by mouth 2 times daily, Disp-180 capsule, R-1Normal      sucralfate (CARAFATE) 1 GM tablet Take 1 tablet by mouth 4 times daily, Disp-120 tablet, R-3Normal      polyethylene glycol (GLYCOLAX) 17 GM/SCOOP powder Take 17 g by mouth daily as needed (constipation), Disp-510 g, R-1Normal      psyllium (KONSYL) 28.3 % POWD powder Take 3.4 g by mouth 2 times daily Before breakfast and dinner., Disp-575 g, R-3Normal      Omega-3 Fatty

## 2025-02-03 NOTE — ED TRIAGE NOTES
Patient arrives to ED reports injuring left foot while at work.  Patient states boxes landed on her foot.